# Patient Record
(demographics unavailable — no encounter records)

---

## 2024-12-18 NOTE — ASSESSMENT
[FreeTextEntry1] : Eunice Tucker- 58M no ACAP, presented to ER 12/4/2024 w/ 1d of increased sleepiness, s/p fall off ladder 11/15, had prior stay at [facility] w/ SDH, d/c'd to outpt nsgy f/u sched for tmrw. 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 Per wife, pt has been getting progressively sleepier over the past 1-2d. SP left craniotomy sdh 12/3/2024 and left mma embo 12/5/2024. Incision cdi. No n/v/fevers. Moderate HA intermittent and orthostasis but drastically improved from preop. Doing well. -CTH 3 months then fu with me

## 2024-12-18 NOTE — HISTORY OF PRESENT ILLNESS
[FreeTextEntry1] : Eunice Tucker- 58M no ACAP, presented to ER 12/4/2024 w/ 1d of increased sleepiness, s/p fall off ladder 11/15, had prior stay at [facility] w/ SDH, d/c'd to outpt nsgy f/u sched for tmrw. 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 Per wife, pt has been getting progressively sleepier over the past 1-2d. SP left craniotomy sdh 12/3/2024 and left mma embo 12/5/2024. Incision cdi. No n/v/fevers. Moderate HA intermittent and orthostasis but drastically improved from preop. Doing well. -CTH 3 months then fu with me

## 2024-12-18 NOTE — PHYSICAL EXAM
[General Appearance - Alert] : alert [General Appearance - In No Acute Distress] : in no acute distress [Clean] : clean [Dry] : dry [Healing Well] : healing well [Oriented To Time, Place, And Person] : oriented to person, place, and time [Impaired Insight] : insight and judgment were intact [Affect] : the affect was normal [Over the Past 2 Weeks, Have You Felt Down, Depressed, or Hopeless?] : 1.) Over the past 2 weeks, have you felt down, depressed, or hopeless? No [Over the Past 2 Weeks, Have You Felt Little Interest or Pleasure Doing Things?] : 2.) Over the past 2 weeks, have you felt little interest or pleasure doing things? No [Person] : oriented to person [Place] : oriented to place [Time] : oriented to time [Short Term Intact] : short term memory intact [Remote Intact] : remote memory intact [Span Intact] : the attention span was normal [Concentration Intact] : normal concentrating ability [Fluency] : fluency intact [Comprehension] : comprehension intact [Current Events] : adequate knowledge of current events [Past History] : adequate knowledge of personal past history [Vocabulary] : adequate range of vocabulary [Cranial Nerves Optic (II)] : visual acuity intact bilaterally,  pupils equal round and reactive to light [Cranial Nerves Oculomotor (III)] : extraocular motion intact [Cranial Nerves Trigeminal (V)] : facial sensation intact symmetrically [Cranial Nerves Facial (VII)] : face symmetrical [Cranial Nerves Vestibulocochlear (VIII)] : hearing was intact bilaterally [Cranial Nerves Glossopharyngeal (IX)] : tongue and palate midline [Cranial Nerves Accessory (XI - Cranial And Spinal)] : head turning and shoulder shrug symmetric [Cranial Nerves Hypoglossal (XII)] : there was no tongue deviation with protrusion [Motor Tone] : muscle tone was normal in all four extremities [Motor Strength] : muscle strength was normal in all four extremities [No Muscle Atrophy] : normal bulk in all four extremities [Sensation Tactile Decrease] : light touch was intact [Sclera] : the sclera and conjunctiva were normal [PERRL With Normal Accommodation] : pupils were equal in size, round, reactive to light, with normal accommodation [Extraocular Movements] : extraocular movements were intact

## 2025-02-23 NOTE — ASSESSMENT
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 SP left craniotomy for SDH on 12/3/2024 with Dr. Mendoza. and left mma embo 12/5/2024. Moderate HA intermittent and orthostasis and was seen by neurology for headache management. MRI head at open MRI on 1/13/25 reported with left cSDH measuring up to 0.4 cm(no CD yet).    PLAN  MRI Head w/o contrast  F/U with neurologist for headache management.  Continue PT and speech therapy at transitions  F/U in 1 month

## 2025-02-23 NOTE — PHYSICAL EXAM
[General Appearance - Alert] : alert [General Appearance - In No Acute Distress] : in no acute distress [General Appearance - Well Nourished] : well nourished [General Appearance - Well-Appearing] : healthy appearing [Oriented To Time, Place, And Person] : oriented to person, place, and time [Impaired Insight] : insight and judgment were intact [Affect] : the affect was normal [Memory Recent] : recent memory was not impaired [Person] : oriented to person [Place] : oriented to place [Time] : oriented to time [Short Term Intact] : short term memory intact [Cranial Nerves Optic (II)] : visual acuity intact bilaterally,  pupils equal round and reactive to light [Cranial Nerves Oculomotor (III)] : extraocular motion intact [Cranial Nerves Trigeminal (V)] : facial sensation intact symmetrically [Cranial Nerves Facial (VII)] : face symmetrical [Cranial Nerves Vestibulocochlear (VIII)] : hearing was intact bilaterally [Cranial Nerves Accessory (XI - Cranial And Spinal)] : head turning and shoulder shrug symmetric [Cranial Nerves Hypoglossal (XII)] : there was no tongue deviation with protrusion [Motor Tone] : muscle tone was normal in all four extremities [Motor Strength] : muscle strength was normal in all four extremities [Abnormal Walk] : normal gait [Balance] : balance was intact [Sclera] : the sclera and conjunctiva were normal [PERRL With Normal Accommodation] : pupils were equal in size, round, reactive to light, with normal accommodation [Outer Ear] : the ears and nose were normal in appearance [Neck Appearance] : the appearance of the neck was normal [Respiration, Rhythm And Depth] : normal respiratory rhythm and effort [Heart Rate And Rhythm] : heart rate was normal and rhythm regular [No Spinal Tenderness] : no spinal tenderness [Involuntary Movements] : no involuntary movements were seen [FreeTextEntry8] : no drift, no imbalance at tandem

## 2025-02-23 NOTE — REASON FOR VISIT
[Follow-Up: _____] : a [unfilled] follow-up visit [Family Member] : family member [FreeTextEntry1] : TBI [Interpreters_IDNumber] : [id number] [Interpreters_FullName] : Jesus  [TWNoteComboBox1] : [place]ian

## 2025-02-23 NOTE — HISTORY OF PRESENT ILLNESS
[FreeTextEntry1] : Eunice Tucker- 58 M no ACAP, presented to ER 12/4/2024 w/ 1d of increased sleepiness, s/p fall off ladder at work on 11/15, had prior stay at [facility] w/ SDH, d/c'd to outpt nsgy f/u . 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   SP left craniotomy sdh on 12/3/2024 by Dr. Mendoza and left mma embo 12/5/2024., post op visits with Dr. Mendoza with no n/v/fevers. Moderate HA intermittent and orthostasis but drastically improved from preop, he was ordered f/u scan in 3 months, but due to [facility] covering the visits pt is establishing care with this office.   Today Mr. Tucker present ambulatory with cane support.  pt's daughter and  accompanied her in this visit. He believes that he improved overall. Denies speech impairment, vision problems or seizures. Pt has seen neurologist for headache and was ordered Furacet  which is not helping. Pt also doing PT for weakness of the legs and arms and also speech therapy. Pt has seen his ophthalmologist who advised him to undergo cataract surgery. He reports having double vision and  is working on getting scheduled him for a neuro ophthalmology evaluation. He had an MRI head ordered by neurologist and completed it on 1/13/25 at [facility].

## 2025-04-09 NOTE — ASSESSMENT
[FreeTextEntry1] : IMPRESSION: 58M no ACAP,  s/p fall off ladder 11/15 work related injury, had prior stay at [facility] w/ SDH, d/c'd to out pt nsgy. 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 SP left craniotomy for SDH on 12/3/2024 with Dr. Mendoza. and left mma embo 12/5/2024. Moderate HA intermittent and orthostasis and was seen by neurology for headache management. MRI head at open MRI on 1/13/25 reported with left cSDH measuring up to 0.4 cm. Repeat MRI Head on 2/24/25 with no bleed and CT head from the hospital also with stable finding.   Pt with significant headache in the left parieto temporal area, managed by pain neurologist. We will refer to pain management for possible injection to scalp.    PLAN  MRI Head w/o contrast for evaluation of headache, there is an order placed by another doctor and the [facility] auths are under way  F/U with neurologist for headache management.  Referred to pain management for injections  Continue PT and speech therapy at transitions  F/U in 1 month

## 2025-04-09 NOTE — HISTORY OF PRESENT ILLNESS
[FreeTextEntry1] : Eunice Tucker- 58 M no ACAP, presented to ER 12/4/2024 w/ 1d of increased sleepiness, s/p fall off ladder at work on 11/15, had prior stay at [facility] w/ SDH, d/c'd to outpt nsgy f/u . 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 S/P left craniotomy sdh on 12/3/2024 by Dr. Mendoza and left mma embo 12/5/2024., post op visits with Dr. Mendoza with no n/v/fevers. Moderate HA intermittent and orthostasis but drastically improved from preop, he was ordered f/u scan in 3 months, but due to [facility] covering the visits pt is establishing care with this office. Moderate HA intermittent and orthostasis and was seen by neurology for headache management. MRI head at open MRI on 1/13/25 reported with left cSDH measuring up to 0.4 cm (no CD yet).    Pt today reports with significant headache, not getting better. His HA is managed by neurology with tab Topiramate and sumatriptan. He also reports having dizziness, especially when lying down and changing position. He feels the headache in the left temporal and taking medication with relief. Pt had completed doing his MRI Head on 2/24/25. Pt also was seen in [facility] with headache and CT head was complete at that time.

## 2025-04-09 NOTE — ASSESSMENT
[FreeTextEntry1] : IMPRESSION: 58M no ACAP,  s/p fall off ladder 11/15 work related injury, had prior stay at [facility] w/ SDH, d/c'd to out pt nsgy. 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 SP left craniotomy for SDH on 12/3/2024 with Dr. Mendoza. and left mma embo 12/5/2024. Moderate HA intermittent and orthostasis and was seen by neurology for headache management. MRI head at open MRI on 1/13/25 reported with left cSDH measuring up to 0.4 cm. Repeat MRI Head on 2/24/25 with no bleed and CT head from the hospital also with stable finding.   Pt with significant headache in the left parieto temporal area, managed by pain neurologist. We will refer to pain management for possible injection to scalp.    PLAN  MRI Head w/o contrast for evaluation of headache, there is an order placed by another doctor and the [facility] auths are under way  F/U with neurologist for headache management.  Referred to pain management for injections  Continue PT and speech therapy at transitions  F/U in 1 month

## 2025-04-09 NOTE — REASON FOR VISIT
[FreeTextEntry1] : TBI [Interpreters_IDNumber] : [id number] [Interpreters_FullName] : Jesus  [TWNoteComboBox1] : [place] African

## 2025-04-09 NOTE — REASON FOR VISIT
[FreeTextEntry1] : TBI [Interpreters_IDNumber] : [id number] [Interpreters_FullName] : Jesus  [TWNoteComboBox1] : Brazilian

## 2025-04-09 NOTE — HISTORY OF PRESENT ILLNESS
[FreeTextEntry1] : Eunice Tucker- 58 M no ACAP, presented to ER 12/4/2024 w/ 1d of increased sleepiness, s/p fall off ladder at work on 11/15, had prior stay at [facility] w/ SDH, d/c'd to outpt nsgy f/u . 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 S/P left craniotomy sdh on 12/3/2024 by Dr. Mendoza and left mma embo 12/5/2024., post op visits with Dr. Mendoza with no n/v/fevers. Moderate HA intermittent and orthostasis but drastically improved from preop, he was ordered f/u scan in 3 months, but due to [facility] covering the visits pt is establishing care with this office. Moderate HA intermittent and orthostasis and was seen by neurology for headache management. MRI head at open MRI on 1/13/25 reported with left cSDH measuring up to 0.4 cm (no CD yet).    Pt today reports with significant headache, not getting better. His HA is managed by neurology with tab Topiramate and sumatriptan. He also reports having dizziness, especially when lying down and changing position. He feels the headache in the left temporal and taking medication with relief. Pt had completed doing his MRI Head on 2/24/25. Pt also was seen in [facility] with headache and CT head was complete at that time.

## 2025-05-30 NOTE — HISTORY OF PRESENT ILLNESS
[FreeTextEntry1] : Eunice Tucker- 58 M no ACAP, presented to ER 12/4/2024 w/ 1d of increased sleepiness, s/p fall off ladder at work on 11/15, had prior stay at [facility] w/ SDH, d/c'd to outpt nsgy f/u . 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 S/P left craniotomy sdh on 12/3/2024 by Dr. Mendoza and left mma embo 12/5/2024., post op visits with Dr. Mendoza with no n/v/fevers. Moderate HA intermittent and orthostasis but drastically improved from preop, he was ordered f/u scan in 3 months, but due to [facility] covering the visits pt is establishing care with this office. Moderate HA intermittent and orthostasis and was seen by neurology for headache management. MRI head at open MRI on 1/13/25 reported with left cSDH measuring up to 0.4 cm (no CD yet).  Pt today reports with significant headache, not getting better. His HA is managed by neurology with tab Topiramate and sumatriptan. He also reports having dizziness, especially when lying down and changing position. He feels the headache in the left temporal and taking medication with relief. Pt had completed doing his MRI Head on 2/24/25. Pt also was seen in [facility] with headache and CT head was complete at that time.

## 2025-06-06 NOTE — ASSESSMENT
[Indicate if, in your opinion, the incident that the patient described was the competent medical cause of this injury/illness.] : The incident that the patient described was the competent medical cause of this injury/illness: Yes [Indicate if the patient's complaints are consistent with his/her history of the injury/illness.] : Indicate if the patient's complaints are consistent with his/her history of the injury/illness: Yes [Yes] : Yes, it is consistent [Physical Disability Temporary Total] : temporarily total disabled [Subdural hematoma, chronic (432.1 / I62.03)] : postoperative [Can the patient return to usual work activities as indicated? If yes, indicate date___] : The patient cannot return to usual work activities as indicated. [FreeTextEntry5] : 100% [FreeTextEntry1] : IMPRESSION  58M no ACAP, s/p fall off ladder 11/15/24 work related injury, had prior stay at [facility] w/ SDH, d/c'd to out pt nsgy. 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 SP left craniotomy for SDH on 12/3/2024 with Dr. Mendoza. and left MMA embo 12/5/2024. Moderate HA intermittent and orthostasis and was seen by neurology for headache management. MRI head at open MRI on 1/13/25 reported with left CSDH measuring up to 0.4 cm. Repeat MRI Head on 2/24/25 with no bleed and CT head from the hospital also with stable finding. Pt with significant headache in the left parieto temporal area, managed by pain neurologist. We will refer to pain management for possible injection to scalp. Pt is 100% disabled. MRI BRAIN 2/21/25 demonstrates - left frontal convexity subdural hematoma is decreased in size, now measuring 2 mm, previously measuring 4 mm.  Pt will need follow up MRI as pt has continued symptoms despite medication.  PLAN MRI brain without contrast Return in 6 weeks

## 2025-06-06 NOTE — REASON FOR VISIT
[Follow-Up Visit] : a follow-up visit for [Family Member] : family member [Interpreters_IDNumber] : [id number] [Interpreters_FullName] : Eduard Esparza [TWNoteComboBox1] : Ukrainian [FreeTextEntry1] : Eunice Tucker- 58 M no ACAP, presented to ER 12/4/2024 w/ 1d of increased sleepiness, s/p fall off ladder at work on 11/15, had prior stay at [facility] w/ SDH, d/c'd to outpt nsgy f/u . CTH showed large 2cm thick holohemispheric SDH w/ 1.7cm MLS. S/P left craniotomy sdh on 12/3/2024 by Dr. Mendoza and left mma embo 12/5/2024., post op visits with Dr. Mendoza with no n/v/fevers. Moderate HA intermittent and orthostasis but drastically improved from preop, he was ordered f/u scan in 3 months, but due to [facility] covering the visits pt is establishing care with this office. Moderate HA intermittent and orthostasis and was seen by neurology for headache management. MRI head at open MRI on 1/13/25 reported with left cSDH measuring up to 0.4 cm (no CD yet).  Today pt presents with cane assistance and with c/o constant dizziness, headaches - today pain is 5/10, left leg pain 8/10 and has to use a cane to ambulate and pain in left arm 7/10 and he unable to  items > 10 lbs, mid back to lower back pain is 8/10 and has to change positions from a sitting to standing position slowly, neck pain is 7/10. His HA is managed by neurologist Dr. Irby - with tab Topiramate and Sumatriptan 100 mg tabs PRN. He also reports having dizziness, especially when lying down and changing position. He feels the headache in the left temporal and taking medication with relief. Pt had completed doing his MRI Head on 2/24/25. Pt also was seen in [facility] with headache and CT head was complete at that time.  His PCP is DR. Robel Arboleda.

## 2025-06-06 NOTE — ASSESSMENT
[Indicate if, in your opinion, the incident that the patient described was the competent medical cause of this injury/illness.] : The incident that the patient described was the competent medical cause of this injury/illness: Yes [Indicate if the patient's complaints are consistent with his/her history of the injury/illness.] : Indicate if the patient's complaints are consistent with his/her history of the injury/illness: Yes [Yes] : Yes, it is consistent [Physical Disability Temporary Total] : temporarily total disabled [Subdural hematoma, chronic (432.1 / I62.03)] : postoperative [Can the patient return to usual work activities as indicated? If yes, indicate date___] : The patient cannot return to usual work activities as indicated. [FreeTextEntry5] : 100% [FreeTextEntry1] : IMPRESSION  58M no ACAP, s/p fall off ladder 11/15/24 work related injury, had prior stay at [facility] w/ SDH, d/c'd to out pt nsgy. 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 SP left craniotomy for SDH on 12/3/2024 with Dr. Mendoza. and left MMA embo 12/5/2024. Moderate HA intermittent and orthostasis and was seen by neurology for headache management. MRI head at open MRI on 1/13/25 reported with left CSDH measuring up to 0.4 cm. Repeat MRI Head on 2/24/25 with no bleed and CT head from the hospital also with stable finding. Pt with significant headache in the left parieto temporal area, managed by pain neurologist. We will refer to pain management for possible injection to scalp. Pt is 100% disabled. MRI BRAIN 2/21/25 demonstrates - left frontal convexity subdural hematoma is decreased in size, now measuring 2 mm, previously measuring 4 mm.  Pt will need follow up MRI as pt has continued symptoms despite medication.  PLAN MRI brain without contrast Return in 6 weeks

## 2025-06-06 NOTE — PHYSICAL EXAM
[General Appearance - Alert] : alert [Oriented To Time, Place, And Person] : oriented to person, place, and time [Person] : oriented to person [Place] : oriented to place [Time] : oriented to time [Motor Handedness Right-Handed] : the patient is right hand dominant [Sclera] : the sclera and conjunctiva were normal [Outer Ear] : the ears and nose were normal in appearance [Neck Appearance] : the appearance of the neck was normal [] : no respiratory distress [Respiration, Rhythm And Depth] : normal respiratory rhythm and effort [Exaggerated Use Of Accessory Muscles For Inspiration] : no accessory muscle use [Heart Rate And Rhythm] : heart rate was normal and rhythm regular [Involuntary Movements] : no involuntary movements were seen [Skin Color & Pigmentation] : normal skin color and pigmentation [Motor Tone] : muscle tone was normal in all four extremities [Motor Strength] : muscle strength was normal in all four extremities [FreeTextEntry6] : [place] with no drift. [FreeTextEntry8] : walks with cane with mildly antalgic gait.  Without cane, gait is about the same. [FreeTextEntry9] : ambulates with cane assist [FreeTextEntry1] : ambulates with cane assistance

## 2025-06-06 NOTE — HISTORY OF PRESENT ILLNESS
[Has the patient missed work because of the injury/illness?] : The patient has missed work because of the injury/illness. [No] : The patient is currently not working. [FreeTextEntry1] : Eunice Tucker- 58 M no ACAP, presented to ER 12/4/2024 w/ 1d of increased sleepiness, s/p fall off ladder at work on 11/15, had prior stay at [facility] w/ SDH, d/c'd to outpt nsgy f/u . 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 S/P left craniotomy sdh on 12/3/2024 by Dr. Mendoza and left mma embo 12/5/2024., post op visits with Dr. Mendoza with no n/v/fevers. Moderate HA intermittent and orthostasis but drastically improved from preop, he was ordered f/u scan in 3 months, but due to [facility] covering the visits pt is establishing care with this office. Moderate HA intermittent and orthostasis and was seen by neurology for headache management. MRI head at open MRI on 1/13/25 reported with left cSDH measuring up to 0.4 cm (no CD yet).  Pt today reports with significant headache, not getting better. His HA is managed by neurology with tab Topiramate and sumatriptan. He also reports having dizziness, especially when lying down and changing position. He feels the headache in the left temporal and taking medication with relief. Pt had completed doing his MRI Head on 2/24/25. Pt also was seen in [facility] with headache and CT head was complete at that time.  He was ordered for another MRI by another doctor. Pt does not believe he had the MRI.  The head and left leg and left side bothers him the most.  When he walks he sometimes loses his Balance.  PT was cut around March, so not getting physical therapy now.

## 2025-06-06 NOTE — REASON FOR VISIT
[Follow-Up Visit] : a follow-up visit for [Family Member] : family member [Interpreters_IDNumber] : [id number] [Interpreters_FullName] : Eduard Esparza [[id number]] : [place] [FreeTextEntry1] : Eunice Tucker- 58 M no ACAP, presented to ER 12/4/2024 w/ 1d of increased sleepiness, s/p fall off ladder at work on 11/15, had prior stay at [facility] w/ SDH, d/c'd to outpt nsgy f/u . CTH showed large 2cm thick holohemispheric SDH w/ 1.7cm MLS. S/P left craniotomy sdh on 12/3/2024 by Dr. Mendoza and left mma embo 12/5/2024., post op visits with Dr. Mendoza with no n/v/fevers. Moderate HA intermittent and orthostasis but drastically improved from preop, he was ordered f/u scan in 3 months, but due to [facility] covering the visits pt is establishing care with this office. Moderate HA intermittent and orthostasis and was seen by neurology for headache management. MRI head at [facility] on 1/13/25 reported with left cSDH measuring up to 0.4 cm (no CD yet).  Today pt presents with cane assistance and with c/o constant dizziness, headaches - today pain is 5/10, left leg pain 8/10 and has to use a cane to ambulate and pain in left arm 7/10 and he unable to  items > 10 lbs, mid back to lower back pain is 8/10 and has to change positions from a sitting to standing position slowly, neck pain is 7/10. His HA is managed by neurologist Dr. Irby - with tab Topiramate and Sumatriptan 100 mg tabs PRN. He also reports having dizziness, especially when lying down and changing position. He feels the headache in the left temporal and taking medication with relief. Pt had completed doing his MRI Head on 2/24/25. Pt also was seen in [facility] with headache and CT head was complete at that time.  His PCP is DR. Robel Arboleda.

## 2025-06-06 NOTE — HISTORY OF PRESENT ILLNESS
[Has the patient missed work because of the injury/illness?] : The patient has missed work because of the injury/illness. [No] : The patient is currently not working. [FreeTextEntry1] : Eunice Tucker- 58 M no ACAP, presented to ER 12/4/2024 w/ 1d of increased sleepiness, s/p fall off ladder at work on 11/15, had prior stay at [facility] w/ SDH, d/c'd to outpt nsgy f/u . 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 S/P left craniotomy sdh on 12/3/2024 by Dr. Mendoza and left mma embo 12/5/2024., post op visits with Dr. Mendoza with no n/v/fevers. Moderate HA intermittent and orthostasis but drastically improved from preop, he was ordered f/u scan in 3 months, but due to [facility] covering the visits pt is establishing care with this office. Moderate HA intermittent and orthostasis and was seen by neurology for headache management. MRI head at [facility] on 1/13/25 reported with left cSDH measuring up to 0.4 cm (no CD yet).  Pt today reports with significant headache, not getting better. His HA is managed by neurology with tab Topiramate and sumatriptan. He also reports having dizziness, especially when lying down and changing position. He feels the headache in the left temporal and taking medication with relief. Pt had completed doing his MRI Head on 2/24/25. Pt also was seen in [facility] with headache and CT head was complete at that time.  He was ordered for another MRI by another doctor. Pt does not believe he had the MRI.  The head and left leg and left side bothers him the most.  When he walks he sometimes loses his Balance.  PT was cut around March, so not getting physical therapy now.